# Patient Record
Sex: FEMALE | Race: BLACK OR AFRICAN AMERICAN
[De-identification: names, ages, dates, MRNs, and addresses within clinical notes are randomized per-mention and may not be internally consistent; named-entity substitution may affect disease eponyms.]

---

## 2017-02-10 ENCOUNTER — HOSPITAL ENCOUNTER (EMERGENCY)
Dept: HOSPITAL 74 - JERFT | Age: 27
Discharge: HOME | End: 2017-02-10
Payer: COMMERCIAL

## 2017-02-10 VITALS — TEMPERATURE: 99.6 F | HEART RATE: 111 BPM

## 2017-02-10 VITALS — DIASTOLIC BLOOD PRESSURE: 89 MMHG | SYSTOLIC BLOOD PRESSURE: 143 MMHG

## 2017-02-10 VITALS — BODY MASS INDEX: 33.6 KG/M2

## 2017-02-10 DIAGNOSIS — J11.1: Primary | ICD-10-CM

## 2017-02-10 PROCEDURE — 3E0F7GC INTRODUCTION OF OTHER THERAPEUTIC SUBSTANCE INTO RESPIRATORY TRACT, VIA NATURAL OR ARTIFICIAL OPENING: ICD-10-PCS

## 2017-02-10 NOTE — PDOC
History of Present Illness





- General


Chief Complaint: Cold Symptoms


Stated Complaint: COLD SYMPTOMS


Time Seen by Provider: 02/10/17 21:53


History Source: Patient


Exam Limitations: No Limitations





- History of Present Illness


Initial Comments: 





02/10/17 22:00








Chief complaint: Generalized body aches, fever, chills, dry cough sore throat 

ear pain bilaterally and headache








History of present illness: Patient is a 26 year old female with no significant 

medical history here today complaining of generalized body aches with dry cough 

and sore throat and bilateral ear pain intermittent 3 days. Patient reports 

that fever and body aches started yesterday with headache. She reports that she 

got her influenza vaccine this year. Patient reports feeling chest tightness 

with slight shortness of breath today. Patient is unsure of whether or not she 

could be pregnant. Patient reports that other people at her job are sick 

however they do not say what is wrong with them. Patient has had no recent 

travel.


Timing/Duration: getting worse


Associated Symptoms: reports: cough, fever/chills, headaches, malaise, nausea/

vomiting (once last night ), shortness of breath (slight today with chest 

tightness), other (b/l ear pain, sore throat, headache )





Past History





- Past Medical History


Allergies/Adverse Reactions: 


 Allergies











Allergy/AdvReac Type Severity Reaction Status Date / Time


 


No Known Allergies Allergy   Verified 02/10/17 21:38











Home Medications: 


Ambulatory Orders





Azithromycin [Zithromax 250mg Tablets -] 250 mg PO UTDICT #6 tab 02/10/17 


Guaifenesin Dm [Mucinex Dm -] 2 tab PO Q12H PRN #20 tab.er.12h MDD 4 02/10/17 


Ibuprofen 600 mg PO Q6H PRN #18 tablet 02/10/17 








Thyroid Disease: No





- Immunization History


Immunization Up to Date: Yes





- Psycho/Social/Smoking Cessation Hx


Anxiety: No


Suicidal Ideation: No


Smoking History: Never smoked


Have you smoked in the past 12 months: No


Information on smoking cessation initiated: No


Hx Alcohol Use: No


Drug/Substance Use Hx: No


Substance Use Type: None





**Review of Systems





- Review of Systems


Able to Perform ROS?: Yes


Constitutional: Yes: Fever, Loss of Appetite


HEENTM: Yes: Ear Pain (b/l ), Nose Congestion, Throat Pain


Respiratory: Yes: Cough, Shortness of Breath (intermittent ).  No: SOB with 

Exertion, SOB at Rest, Stridor, Wheezing, Productive cough


Cardiac (ROS): Yes: Chest Tightness (today )


ABD/GI: Yes: Vomiting (once last night )


: No: Symptoms Reported


Musculoskeletal: Yes: Other (generalized bodyaches)


Integumentary: No: Symptoms Reported


Neurological: Yes: Headache (intermitent)





*Physical Exam





- Vital Signs


 Last Vital Signs











Temp Pulse Resp BP Pulse Ox


 


 101 F H  119 H  22   143/89   98 


 


 02/10/17 21:32  02/10/17 21:32  02/10/17 21:32  02/10/17 21:32  02/10/17 21:32














- Physical Exam


General Appearance: Yes: Appropriately Dressed


HEENT: positive: TMs Normal, Pharyngeal Erythema, Nasal Congestion, Rhinorrhea (

clear).  negative: Tonsillar Exudate, Tonsillar Erythema


Neck: negative: Lymphadenopathy (R), Lymphadenopathy (L)


Respiratory/Chest: positive: Lungs Clear, Normal Breath Sounds.  negative: 

Chest Tender, Respiratory Distress


Cardiovascular: positive: Regular Rhythm, Regular Rate, S1, S2


Gastrointestinal/Abdominal: positive: Normal Bowel Sounds, Soft.  negative: 

Tender, Organomegaly, Distended, Guarding, Rebound, Tenderness, Hepatomegaly, 

Spleenomegaly


Integumentary: positive: Normal Color


Neurologic: positive: CNs II-XII NML intact, Alert, Normal Response, Responsive





Medical Decision Making





- Medical Decision Making


02/10/17 22:02


Patient is a 26 year old female with no significant medical history here today 

complaining of generalized body aches with dry cough and sore throat and 

bilateral ear pain intermittent 3 days. Patient reports that fever and body 

aches started yesterday with headache. She reports that she got her influenza 

vaccine this year. Patient reports feeling chest tightness with slight 

shortness of breath today. Patient is unsure of whether or not she could be 

pregnant. Patient reports that other people at her job are sick however they do 

not say what is wrong with them. Patient has had no recent travel.














Flulike symptoms rule out influenza A or B





Chest tightness with intermittent cough and slight shortness of breath





Rule out pregnancy








Plan:





Urine hCG negative 


DuoNeb now 


Influenza A and B rapid negative


will treat with azithromycin 250 mg 2 tab today than one daily for following 4 

days


Mucinex DM 2 caps every 12 hrs prn cough for 5 days





ibuprofen 600 mg every 6 hours as needed for fever or pain

















02/10/17 22:17








02/10/17 22:35








02/10/17 22:39








*DC/Admit/Observation/Transfer


Diagnosis at time of Disposition: 


 Flu-like symptoms





- Discharge Dispostion


Disposition: HOME


Condition at time of disposition: Stable





- Referrals


Referrals: 


Lizeth Rojas [Primary Care Provider] - 





- Patient Instructions


Additional Instructions: 











Drink a lot of fluids and rest








Follow-up with primary care provider within the next few days











Return to emergency room if any difficulty breathing or swallowing or any new 

symptoms develop














Patient voiced understanding of discharge instructions and all questions were 

answered
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
## 2017-12-19 NOTE — PDOC
History of Present Illness





<Khalida Jones - Last Filed: 12/19/17 16:49>





- General


History Source: Patient


Exam Limitations: No Limitations





- History of Present Illness


Initial Comments: 





12/19/17 16:51


The patient is a 27 year old female, with a significant past medical history of 

Anemia, Irregular menses who presents to the emergency department with nausea, 

vomiting (nonbilious, nonbloody), diarrhea for the past 4 days. Patient reports 

associated generalized abdominal pain, 7/10 in severity that is burning in 

nature which radiates from lower abdominal pain to epigastric. Patient has 

taken ginger ale, pepto bismol, bassem seltzer with no relief and presents to the 

ED for further evaluation. 





LMP: 2.5 months ago 





Patient denies chest pain, headache or dizziness. Patient denies fever, chills, 

constipation. Patient denies dysuria, frequency, urgency or hematuria. Patient 

denies sick contacts or recent travel. 











<Anaid Ha - Last Filed: 12/19/17 17:06>





- General


Chief Complaint: Pain, Acute


Stated Complaint: ABD PAIN


Time Seen by Provider: 12/19/17 14:03





Past History





- Past Medical History


Anemia: Yes


COPD: No


Thyroid Disease: No





- Immunization History


Immunization Up to Date: Yes





- Suicide/Smoking/Psychosocial Hx


Smoking History: Never smoked


Have you smoked in the past 12 months: No


Hx Alcohol Use: No


Drug/Substance Use Hx: No


Substance Use Type: None





<Khalida Jones - Last Filed: 12/19/17 16:49>





<Anaid Ha - Last Filed: 12/19/17 17:06>





- Past Medical History


Allergies/Adverse Reactions: 


 Allergies











Allergy/AdvReac Type Severity Reaction Status Date / Time


 


No Known Allergies Allergy   Verified 12/19/17 13:55











Home Medications: 


Ambulatory Orders





NK [No Known Home Medication]  07/17/17 











**Review of Systems





- Review of Systems


Able to Perform ROS?: Yes


Comments:: 





12/19/17 16:51





GENERAL/CONSTITUTIONAL: No fever or chills. No weakness.


HEAD, EYES, EARS, NOSE AND THROAT: No change in vision. No ear pain or 

discharge. No sore throat.


GASTROINTESTINAL: + nausea, vomiting, diarrhea. No constipation.


GENITOURINARY: No dysuria, frequency, or change in urination.


CARDIOVASCULAR: No chest pain or shortness of breath.


RESPIRATORY: No cough, wheezing, or hemoptysis.


MUSCULOSKELETAL: No joint or muscle swelling or pain. No neck or back pain.


SKIN: No rash


NEUROLOGIC: No headache, vertigo, loss of consciousness, or change in strength/

sensation.


ENDOCRINE: No increased thirst. No abnormal weight change.


HEMATOLOGIC/LYMPHATIC: No anemia, easy bleeding, or history of blood clots.


ALLERGIC/IMMUNOLOGIC: No hives or skin allergy.











<Anaid Ha - Last Filed: 12/19/17 17:06>





*Physical Exam





- Vital Signs


 Last Vital Signs











Temp Pulse Resp BP Pulse Ox


 


 97.9 F   77   19   115/75   100 


 


 12/19/17 13:56  12/19/17 13:56  12/19/17 13:56  12/19/17 13:56  12/19/17 13:56














<Khalida Jones - Last Filed: 12/19/17 16:49>





- Vital Signs


 Last Vital Signs











Temp Pulse Resp BP Pulse Ox


 


 97.9 F   77   19   115/75   100 


 


 12/19/17 13:56  12/19/17 13:56  12/19/17 13:56  12/19/17 13:56  12/19/17 13:56














- Physical Exam


Comments: 





12/19/17 16:51





GENERAL: Awake, alert, and fully oriented, in no acute distress


HEAD: No signs of trauma


EYES: PERRLA, EOMI, sclera anicteric, conjunctiva clear


ENT: Auricles normal inspection, hearing grossly normal, nares patent, 

oropharynx clear without exudates. Moist mucosa


NECK: Normal ROM, supple, no lymphadenopathy, JVD, or masses


LUNGS: Breath sounds equal, clear to auscultation bilaterally.  No wheezes, and 

no crackles


HEART: Regular rate and rhythm, normal S1 and S2, no murmurs, rubs or gallops


ABDOMEN: Soft, nontender, normoactive bowel sounds.  No guarding, no rebound.  

No masses


EXTREMITIES: Normal range of motion, no edema.  No clubbing or cyanosis. No 

cords, erythema, or tenderness


NEUROLOGICAL: Cranial nerves II through XII grossly intact.  Normal speech, 

normal gait


SKIN: Warm, Dry, normal turgor, no rashes or lesions noted.


PELVIC:Scant amount  of pink discharge. No CMT. No adnexal tenderness. 





<Anaid Ha - Last Filed: 12/19/17 17:06>





ED Treatment Course





- LABORATORY


CBC & Chemistry Diagram: 


 12/19/17 15:33





 12/19/17 15:33





<Khalida Jones - Last Filed: 12/19/17 16:49>





- LABORATORY


CBC & Chemistry Diagram: 


 12/19/17 15:33





 12/19/17 15:33





- ADDITIONAL ORDERS


Additional order review: 


 Laboratory  Results











  12/19/17 12/19/17





  15:33 15:16


 


Urine Color   Yellow


 


Urine Appearance   Slcloudy


 


Urine pH   7.0


 


Ur Specific Gravity   1.028


 


Urine Protein   Negative


 


Urine Glucose (UA)   Negative


 


Urine Ketones   1+ H


 


Urine Blood   Negative


 


Urine Nitrite   Negative


 


Urine Bilirubin   Negative


 


Urine Urobilinogen   Negative


 


Urine HCG, Qual  Positive 








 











  12/19/17





  15:33


 


RBC  5.16


 


MCV  76.2 L


 


MCHC  33.6


 


RDW  15.0


 


MPV  9.3


 


Neutrophils %  66.2


 


Lymphocytes %  24.7


 


Monocytes %  7.6


 


Eosinophils %  0.9


 


Basophils %  0.6














- Medications


Given in the ED: 


ED Medications














Discontinued Medications














Generic Name Dose Route Start Last Admin





  Trade Name Freq  PRN Reason Stop Dose Admin


 


Al Hydroxide/Mg Hydroxide  30 ml  12/19/17 14:17  12/19/17 15:20





  Mylanta Oral Suspension -  PO  12/19/17 14:18  30 ml





  ONCE ONE   Administration


 


Famotidine/Sodium Chloride  20 mg in 50 mls @ 100 mls/hr  12/19/17 14:16  12/19/ 17 15:25





  Pepcid 20 Mg Premixed Ivpb -  IVPB  12/19/17 14:45  100 mls/hr





  ONCE ONE   Administration


 


Ondansetron HCl  4 mg  12/19/17 14:16  12/19/17 15:20





  Zofran Injection  IVPUSH  12/19/17 14:17  4 mg





  ONCE ONE   Administration


 


Sodium Chloride  1,000 ml  12/19/17 14:16  12/19/17 15:20





  Normal Saline -  IV  12/19/17 14:17  1,000 ml





  ONCE ONE   Administration














<Anaid Ha - Last Filed: 12/19/17 17:06>





Medical Decision Making





- Medical Decision Making





12/19/17 16:49


a/p: 28yo female with abd pain, n/v/d


-will check labs


-meds


-will give ivf hydration


12/19/17 16:49


pt with +beta on exam


12/19/17 16:49


pt updated and getting undressed for pelvic exam


had home preg test yesterday that was negative


urine preg +


no menstrual cycle in 2.5 months


suspected gastroenteritis


now will check for IUP and n/v most likely due to preg





<Khalida Jones - Last Filed: 12/19/17 16:49>





- Medical Decision Making








12/19/17 17:02


Upon re-evaluation, patient reports minimal bloody vaginal discharge. 


Pelvic Exam reveals scant pink bloody discharge. 














<Anaid Ha - Last Filed: 12/19/17 17:06>





*DC/Admit/Observation/Transfer





<Khalida Jones - Last Filed: 12/19/17 16:49>





- Attestations


Scribe Attestion: 





12/19/17 16:51





Documentation prepared by Anaid Ha, acting as medical scribe for Khalida Jones DO








<Anaid Ha - Last Filed: 12/19/17 17:06>





- Referrals


Referrals: 


Shannon Galindo MD [Primary Care Provider] - 





- Patient Instructions





- Post Discharge Activity

## 2018-04-19 NOTE — PN
Progress Note (short form)





- Note


Progress Note: 


23.2 weeks, lt renal colick, s/p demerol 50 mg  + phenrgan 25 mg iv stat at 

9.45 Pm ( 18) 


pt rested  & slept after demerol , pain free,, no c/o back ache 


presently c/o pain scale 6/10 , no vomiting, did not pass any stone yet 


fm are active 


no c/o cramps 








 Selected Entries











  18





  06:00


 


Temperature 98.3 F


 


Pulse Rate 85


 


Blood Pressure 123/71








labs are noted, cmp not done , 


                     uc/s not done 


plan order above tests 


               reg diet 


               ct iv fluids , & encourage po fluids 


               urology consult pending 


               transfer to floor ( [address] ) 





Problem List





- Problems


(1) 23 weeks gestation of pregnancy


Code(s): Z3A.23 - 23 WEEKS GESTATION OF PREGNANCY   





(2) Renal colic on left side


Code(s): N23 - UNSPECIFIED RENAL COLIC   





(3) Previous  section


Code(s): Z98.891 - HISTORY OF UTERINE SCAR FROM PREVIOUS SURGERY   





(4) Obesity (BMI 30.0-34.9)


Code(s): E66.9 - OBESITY, UNSPECIFIED
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
## 2018-04-20 NOTE — DS
Physical Exam-GYN


Vital Signs: 


 Vital Signs











Temperature  97.9 F   18 09:30


 


Pulse Rate  83   18 09:30


 


Respiratory Rate  20   18 09:30


 


Blood Pressure  120/68   18 09:30


 


O2 Sat by Pulse Oximetry (%)      











Constitutional: Yes: Well Nourished, Obese


Eyes: Yes: WNL


HENT: Yes: WNL


Neck: Yes: WNL


Cardiovascular: Yes: Pulse Irregular


Respiratory: Yes: WNL


Gastrointestinal: Yes: WNL


...Rectal Exam: Yes: WNL


Renal/: Yes: Other (pt either has no complain or minimal pain.  voiding 

without difficulty)


Pelvis: Yes: WNL


Cervix: Yes: Normal, Other (cx closed)


Uterus: Yes: Other (   23 .4 weeks pregnant FPF.FM active.FHS+ no cramps or uc 

palpable.)


Breast(s): Yes: WNL


Extremities: No: Calf Tenderness


Edema: No


Integumentary: Yes: Incision (prevoius pfannensteil scar of c/section)


Neurological: Yes: WNL


...Motor Strength: WNL


Psychiatric: Yes: WNL


Labs: 


 CBC, BMP





 18 07:15 





 18 07:15 





urine culture : no growth.





Discharge Summary


Reason For Visit: LABOR


Current Active Problems





23 weeks gestation of pregnancy (Acute)


Obesity (BMI 30.0-34.9) (Acute)


Previous  section (Acute)


Renal colic on left side (Acute)








Procedures: Principal: iv hydration


Other Procedures: ob ultrasound 23.6 weeks/sliup/breech/cx3.6 cm.  bilateral 

kidney us, Left hydronephrosi, possible block in lower part of collecting 

system.  pelvic / bladder us full bladder  Right ureter jet noted, Left 

ureter jet not noted.  18 Reoeat bladder pelvic us : bilateral ureter jet 

visualised


Hospital Course: 





Iv Hydration followed by ct iv fluids at 150 ml/hr


intial Demerol 50 mg + phergan 25 mg for pain control followed by Tylenol & or 

Oxycodone.


Urology consult with Dr Henning :Tel recommendation about pelvic /bladder us / 

Iv Hydration / Po Flomax 0.4 mg daily for ureter dilatation.


urine straining was done.


pt improved n pin free, Repeat us bilateral ureteric jet noted


Fetal surviellence by franca Twice shift NST was done 


























Condition: Stable





- Instructions


Diet, Activity, Other Instructions: 


Discharge Instructions


* Out of Bed


* drink plenty po fluids


* Regular Diet      


* quit smoking


* Rtc 1 week, keep prenatal clinic appt. 


* ct prenatal vit daily


* keep kick count.





 





Disposition: HOME





- Home Medications


Comprehensive Discharge Medication List: 


Ambulatory Orders





B12/Levomefolate Calcium/B-6 [Foltx Tablet] 1 each PO DAILY 18 


Prenatal Vit No.130/Iron/Folic [Prenatal Vitamins] 1 each PO DAILY 18 


Acetaminophen [Tylenol .Regular Strength -] 650 mg PO Q4H PRN  tablet 18 


Prenatal Vitamins (Sjr) - 1 tab PO DAILY  tablet 18

## 2018-04-20 NOTE — PN
Progress Note (short form)





- Note


Progress Note: 





HD1 ,has mild supra pubic pain, no flank pan , no contraction, no vaginal 

bleeding, no hematuria, no dysuria


 Last Vital Signs











Temp Pulse Resp BP Pulse Ox


 


 98.2 F   81   20   103/51    


 


 04/19/18 21:00  04/19/18 21:00  04/19/18 21:00  04/19/18 21:00   











afebrile


abdomen soft, non tender, no cva 


uterus non tender, soft


no calf tenderness 


plan urology evaluation, .cbc 


possible d/c home after urology evaluation

## 2018-08-15 NOTE — OP
Operative Note





- Note:


Operative Date: 08/15/18


Pre-Operative Diagnosis: 40.2 weeks, previous c/section , requests for repeat c/

s


Operation: Repeat LFTC/Section & Lysis of adhesions


Findings: 


8.44AM, Baby Boy ,Apgar 9/9, wt 8'7",Vx,  LOT position 


Both tubes & Ovaries normal 


adhesions utero lateral abdominal wall & omental adhesions lysis done 


Jason Neonatologist present in the OR 








Surgeon: Megan Jaime


Assistant: Coy Cooley


Anesthesiologist/CRNA: Cristhian Feng


Anesthesia: Spinal


Specimens Removed: cord blood.  placenta


Estimated Blood Loss (mls): 800


Drains, Volume Out (mls): 300 (ruiz output brina color )


Fluid Volume Replaced (mls): 2,000 (iv ancef 2 gm prior to incision )


Operative Report Dictated: Yes

## 2018-08-15 NOTE — OP
DATE OF OPERATION:  08/15/2018

 

PREOPERATIVE DIAGNOSIS:  At 40-2/7 weeks, previous  section, request for

repeat  section.

 

OPERATION DONE:  Repeat low-flap transverse  section, lysis of adhesions.

 

SURGEON:  Megan Jaime MD

 

ASSISTANT SURGEON:  JARED Meyer

 

ANESTHESIOLOGIST:  Cristhian Feng MD

 

ANESTHESIA:  Spinal.  

 

NEONATOLOGIST:  Leilani Kaplan MD

 

FINDINGS:  This is a 28-year-old,  2, para 1-0-0-1, previous  section,

and not in labor.  

 

POSTOPERATIVE FINDINGS:  Baby boy, Apgar 9 and 9, weight 8 pounds 7 ounces, LOT

position.

 

PROCEDURE:  Abdomen was shaved, prepped.  Trammell catheter was placed.  She was taken

to the operating room table.  Spinal anesthesia was given.  Abdomen was painted and

draped in the usual manner.  Pfannenstiel incision was made through previous scar,

skin and subcutaneous tissue.  Anterior rectus sheath was incised transversely. 

Bleeding points were clamped and cauterized.  Then rectus muscle was  from

the rectus sheath.  Parietal peritoneum was opened vertically and then it was

extended transversely.  There were adhesions between the uterus and the left lateral

abdominal wall, which were lysed.  There was no definite bladder peritoneum noted. 

The bladder was pushed down and then lower uterine segment was isolated.  Lower

uterine segment was incised transversely.  Amniotic fluid was clear.  Baby was

delivered at 8:44 a.m. from LOT position.  Amniotic fluid was brina color, clear. 

Baby's Apgar was 9 and 9, weight was 8 pounds 7 ounces.  Baby was handed over to Dr. Kaplan.  Cord was clamped, cut, cord blood was collected.  Placenta was removed

completely with the membranes.  Uterine cavity was cleaned, and the closure of the

uterine incision was done in 2 layers.  The first layer was a continuous locking

suture with a Biosyn 0 suture, second layer was a continuous intermittent locking

with a Biosyn 0 suture.  Hemostasis was verified and incision was then covered with

an interrupted suture between the uterine serosa and the bladder peritoneum.  Both

the tubes and ovaries were normal.  Irrigation was done.  Sponge, instrument, needle

count was correct.  Then the closure of the abdomen was done.  Before that, omental

adhesions to the abdomen, uterus, and the omentum were clamped and cauterized, and

also between uterus and the left lateral abdominal wall, adhesions were dissected and

hemostasis was noted.  Then the parietal peritoneum was closed with a Vicryl 0

suture.  Continuous sutures were taken.  The muscles were approximated together with

a Vicryl 0 suture, interrupted sutures were taken.  Anterior rectus sheath was

mobilized from underneath the skin and then anterior rectus sheath was closed with a

Vicryl 0 suture.  Continuous sutures were taken.  Hemostasis was checked at each

layer.  Then the skin and subcutaneous tissue was approximated with a 2-0 Biosyn

suture and then skin was approximated with 3-0 Vicryl intradermal suture on a

straight needle.  Steri-Strips applied.  Pressure dressing was given.  Blood clots

were removed from the vagina.  Estimated blood loss was 800 mL, intraoperative urine

output was 300 mL and it was brina color.  IV fluids given were 2000 mL during the

surgery.  IV Ancef 2 g, prior to the incision, was given.  Patient tolerated the

procedure well and she was transferred to the recovery room in stable condition. 

 

 

JOVI [id number]

DD: 08/15/2018 10:30

DT: 08/15/2018 20:03

Job #:  [id number]

## 2018-08-15 NOTE — PN
Delivery





- Delivery


 Section: Repeat, Low Flap Transverse (Lysis of Addhesions)


Type of Anesthesia: Spinal


Episiotomy/Laceration: None


EBL (cc): 800 (ruiz output )





Delivery, Single Birth





- Stages of Labor


Date of Delivery: 08/15/18


Time of Delivery: 08:44


Time Placenta Delivered: 08:45


Placenta: Yes: Manual Removal, Uterine Exploration





- Condition of Infant


Pediatrician/Neonatologist Present: Yes


Name: Leilani Kaplan


Infant Gender: Male


Birth Weight: 8 lb 7 oz


Position: Left, OT


Total Hours ROM (Hrs/Mins): 3 mins





- Apgar


  ** 1 Minute


Apgar Total Score: 9





  ** 5 Minutes


Apgar Total Score: 9





-  Feeding Plan


Initial Plan: Elected not to breastfeed exclusively throughout hospitalization





Remarks





- Remarks


Remarks: 


28 yrs , 40.2 weeks , pnc at 2, [facility] 


Indication; 40.2 weeks, previous c/section 


gbs neg 


h/o marijuana pos during pregnancy , stopped 


h/o depression in past 


Intraop course uneventful 


2 gm iv Ancef preop was given

## 2018-08-15 NOTE — HP
Past Medical History





- Primary Care Physician


PCP:: Megan Jaime





- Admission


Chief Complaint: 28 yrs  , 40.2 weeks, previous c/section, requests for 

repeat c/section


History of Present Illness: 


PNC at 2, [facility] 


wt gain 36 lbs 


prenatal work up A neg, Rhogam taken on 19 .


Hbsag neg, Rpr nr, Hiv neg 18 & 1718 , Rubella immune, Quqntiferon neg, 

PNGT 92, GBS neg, gc/ct neg, Pap NILM .


h/o HSV Igg pos .Rx po valtrex 500 mg daily continued 


h/o hospitalization for Renal colick in 4/18/2018 x3 days left side hydroureter 

was seen ,block suspected  Rx with Flomax .


h/o depression , seen therapist once only.No meds  


h/o marijuana pos in 18 in the hosp &  in the clinic , Last checked on 

18 neg .


Fetal growth US were done by MFM 








Limitations to Obtaining History: No Limitations, Intubated





- Past Medical History


CNS: No: Migraine, Seizure


Cardiovascular: No: HTN, Murmur


Pulmonary: No: Asthma


Gastrointestinal: Yes: Constipation.  No: Gastritis, GERD


Hepatobiliary: No: Hepatitis B


Reproductive: Yes: Other (h/o menorrhagia , h/o blood transfusion at age 12 yrs 

& 30 yrs)


...: 2


...Para: 1 (1/26/15 Primary LFTC/S , FTP 6'12",[facility])


...Term: 0


...: 1


...Spon : 0


...Induced : 0


...Multiple Gestation: 0


...LMP: 10/25/17


... Weeks Gestation by Dates: 42.0


...EDC by Dates: 18


...EDC by Sono: 18 (40.2 weeks )


Heme/Onc: Yes: Anemia (in past , h/o blood transfusion)


Infectious Disease: Yes: STD's (h/o Gonorrhea treated in past).  No: AIDS, HIV, 

Tuberculosis


Psych: Yes: Depression (h/o Depression, treted with Zoloft in past. Pt states 

she is not on any meds during pregn)





- Past Surgical History


Past Surgical History: Yes:  (2015)


Hx Myomectomy: No


Hx Transabdominal Cerclage: No





- Smoking History


Smoking history: Never smoked


Have you smoked in the past 12 months: No





- Alcohol/Substance Use


Hx Alcohol Use: No


History of Substance Use: reports: Marijuana


Date of Last Use: 05/15/18 (18 urine drug tox neg )





Home Medications





- Allergies


Allergies/Adverse Reactions: 


 Allergies











Allergy/AdvReac Type Severity Reaction Status Date / Time


 


No Known Allergies Allergy   Verified 18 20:52














- Home Medications


Home Medications: 


Ambulatory Orders





B12/Levomefolate Calcium/B-6 [Foltx Tablet] 1 each PO DAILY 18 


Prenatal Vit No.130/Iron/Folic [Prenatal Vitamins] 1 each PO DAILY 18 


Acetaminophen [Tylenol .Regular Strength -] 650 mg PO Q4H PRN  tablet 18 


Prenatal Vitamins (Sjr) - 1 tab PO DAILY  tablet 18 











Physical Exam - Maternity


Vital Signs: 


 Vital Signs











Temperature  98.5 F   08/15/18 06:35


 


Pulse Rate  97 H  08/15/18 06:35


 


Respiratory Rate  20   08/15/18 06:35


 


Blood Pressure  103/64   08/15/18 06:35


 


O2 Sat by Pulse Oximetry (%)      











Constitutional: Yes: Well Nourished, No Distress


Eyes: Yes: WNL


HENT: Yes: WNL, Normocephalic


Neck: Yes: WNL


Cardiovascular: Yes: WNL, Regular Rate and Rhythm


Lungs: Clear to auscultation


Breast(s): Yes: WNL





- Abdominal Exam/OB


Fundal Height: 40


Number of Fetuses: Single


Fetal Presentation: Vertex


Contractions: No


Fetal Monitor Mode: External


Fetal Heart Rate (range): 130


Fetal Heart Rate Location: [facility]


Category: I


Accelerations: Uniform


Decelerations: None





- Vaginal Exam/OB


Vaginal Bleediing: No


Speculum Exam: No


Dilatation (cm): close


Effacement (%): unefface


Fetal Presentation: Vertex/Position


Fetal Station: -3





- Physical Exam


Musculoskeletal: Yes: WNL


Extremities: Yes: WNL.  No: Calf Tenderness


Edema: Yes


Edema: LLE: 1+, RLE: 1+


Integumentary: Yes: Incision (pfannensteil scar), Tattoos


Deep Tendon Reflex Grade: Normal +2


...Motor Strength: WNL


Psychiatric: Yes: WNL, Alert, Oriented





- Labs


Lab Results: 





 Laboratory Tests











  18





  16:50 16:50 20:45


 


WBC   


 


Hgb   


 


Hct   


 


Plt Count   


 


PT with INR   


 


INR   


 


PTT (Actin FS)    28.3


 


Sodium   


 


Potassium   


 


Chloride   


 


Carbon Dioxide   


 


BUN   


 


Creatinine   


 


Random Glucose   


 


AST   


 


ALT   


 


Urine Protein   Negative 


 


U Marijuana (THC) Screen  Positive  


 


RPR Titer   














  18





  11:25 11:25 11:25


 


WBC  5.9  


 


Hgb  12.9  


 


Hct  37.5  


 


Plt Count  173  D  


 


PT with INR   11.20 


 


INR   0.99 


 


PTT (Actin FS)   


 


Sodium    139


 


Potassium    4.2


 


Chloride    107


 


Carbon Dioxide    23


 


BUN    8


 


Creatinine    0.6


 


Random Glucose    72 L


 


AST    16


 


ALT    22


 


Urine Protein   


 


U Marijuana (THC) Screen   


 


RPR Titer   














  18





  11:25


 


WBC 


 


Hgb 


 


Hct 


 


Plt Count 


 


PT with INR 


 


INR 


 


PTT (Actin FS) 


 


Sodium 


 


Potassium 


 


Chloride 


 


Carbon Dioxide 


 


BUN 


 


Creatinine 


 


Random Glucose 


 


AST 


 


ALT 


 


Urine Protein 


 


U Marijuana (THC) Screen 


 


RPR Titer  Nonreactive








 Laboratory Tests











  18





  20:45 11:25 11:25


 


WBC   5.9 


 


Hgb   12.9 


 


Plt Count   173  D 


 


PT with INR    11.20


 


INR    0.99


 


PTT (Actin FS)  28.3  


 


Sodium   


 


Potassium   


 


Chloride   


 


Carbon Dioxide   


 


BUN   


 


Creatinine   


 


AST   


 


ALT   














  18





  11:25


 


WBC 


 


Hgb 


 


Plt Count 


 


PT with INR 


 


INR 


 


PTT (Actin FS) 


 


Sodium  139


 


Potassium  4.2


 


Chloride  107


 


Carbon Dioxide  23


 


BUN  8


 


Creatinine  0.6


 


AST  16


 


ALT  22














Hemorrhage Risk Assessment





- Risk Factors


Medium Risk Factors: Yes: Prior , uterine surgery,or multiple 

laparotomies


Risk Score: 1


Risk Level: Medium Risk





Problem List





- Problems


(1) Post term pregnancy over 40 weeks


Code(s): O48.0 - POST-TERM PREGNANCY   





(2) Previous  section


Code(s): Z98.891 - HISTORY OF UTERINE SCAR FROM PREVIOUS SURGERY   





(3) H/O cannabis abuse


Code(s): Z87.898 - PERSONAL HISTORY OF OTHER SPECIFIED CONDITIONS   





(4) H/O: depression


Code(s): Z86.59 - PERSONAL HISTORY OF OTHER MENTAL AND BEHAVIORAL DISORDERS   





Assessment/Plan


28 yrs  , previous c/section , 40.2 weeks requests for repeat c/section .


h/o depression in past 


h/o marijuana in past 


Plan Repeat LFTC/section

## 2018-08-16 NOTE — PN
Post Partum Progress Note





- Subjective


Subjective: 


c/o pain scale 8/10 


voided after ruiz  catheter was taken out 


c/o gaseous discomfort .


c/o itching of body yesterday 





Post Partum Day: 1


Type of Delivery: Repeat C/S


Vital Signs: 


 Vital Signs











Temperature  98.4 F   18 10:00


 


Pulse Rate  82   18 10:00


 


Respiratory Rate  20   18 10:00


 


Blood Pressure  113/61   18 10:00


 


O2 Sat by Pulse Oximetry (%)  100   08/15/18 11:04











Breast Exam: Yes: Soft.  No: Engorged


Uterus: Yes: Fundus Firm, Fundus above umbilicus, Non-tender


Incision: Yes: Dressing dry and intact.  No: Oozing


Abdomen/GI: Yes: Abdomen soft, Tolerating PO (liqiuds ).  No: Abdominal 

Distention, Tender, Passing flatus


Lochia: Yes: Rubra


Lochia, amount: Moderate


Extremities: Yes: Calves non-tender


Perineum: Yes: Intact


Activity: Other (oob in chair )





- Labs


Labs: 


 CBC











WBC  8.2 K/mm3 (4.0-10.0)   18  06:15    


 


RBC  4.46 M/mm3 (3.60-5.2)   18  06:15    


 


Hgb  12.3 GM/dL (10.7-15.3)   18  06:15    


 


Hct  34.6 % (32.4-45.2)   18  06:15    


 


MCV  77.7 fl (80-96)  L  18  06:15    


 


MCH  27.5 pg (25.7-33.7)   18  06:15    


 


MCHC  35.4 g/dl (32.0-36.0)   18  06:15    


 


RDW  14.3 % (11.6-15.6)   18  06:15    


 


Plt Count  155 K/MM3 (134-434)   18  06:15    


 


MPV  9.1 fl (7.5-11.1)   18  06:15    


 


Absolute Neuts (auto)  6.0 K/mm3 (1.5-8.0)   18  06:15    


 


Neutrophils %  72.9 % (42.8-82.8)   18  06:15    


 


Lymphocytes %  17.7 % (8-40)  D 18  06:15    


 


Monocytes %  7.4 % (3.8-10.2)   18  06:15    


 


Eosinophils %  1.5 % (0-4.5)   18  06:15    


 


Basophils %  0.5 % (0-2.0)   18  06:15    


 


Nucleated RBC %  0 % (0-0)   18  06:15    











Other Findings, Remarks: 


rs cta


i/o adequate 








Problem List





- Problems


(1) Post term pregnancy over 40 weeks


Code(s): O48.0 - POST-TERM PREGNANCY   





(2) Previous  section


Code(s): Z98.891 - HISTORY OF UTERINE SCAR FROM PREVIOUS SURGERY   





(3) H/O cannabis abuse


Code(s): Z87.898 - PERSONAL HISTORY OF OTHER SPECIFIED CONDITIONS   





(4) H/O: depression


Code(s): Z86.59 - PERSONAL HISTORY OF OTHER MENTAL AND BEHAVIORAL DISORDERS   





(5) Status post  section routine postpartum follow-up


Code(s): Z39.2 - ENCOUNTER FOR ROUTINE POSTPARTUM FOLLOW-UP; Z98.891 - HISTORY 

OF UTERINE SCAR FROM PREVIOUS SURGERY   





Assessment/Plan


stable s/p repeat c/section 


encourage po fluids , deep breathing & ambulation


dulcolax suppository RI , if passes flatus , will advance to reg diet

## 2018-08-16 NOTE — PN
Progress Note (short form)





- Note


Progress Note: 





POD #1 - s/p  under spinal anesthesia with duramorph. VSS. Pt. doing 

well, sitting


comfortably in chair breastfeeding. No complaints. Good pain control. No 

apparent anesthetic


complications noted. Continue current care.

## 2018-08-17 NOTE — PN
Progress Note (short form)





- Note


Progress Note: 





pod 2 s/p c/s 


doing well. ambulating, passing gas


 CBC, BMP





 08/16/18 06:15 





abdomen soft, no distension, no cva 


incision dry, clean 


no calf tenderness 


lochia mild , 


plan ambulate, cbc in am

## 2018-08-18 NOTE — PN
Progress Note (short form)





- Note


Progress Note: 





pod 3 s/p c/s doing well, voids ok 


 CBC, BMP





 08/18/18 08:18 





 Last Vital Signs











Temp Pulse Resp BP Pulse Ox


 


 98.3 F   84   20   128/79   100 


 


 08/17/18 21:00  08/17/18 21:00  08/17/18 21:00  08/17/18 21:00  08/15/18 11:04








abdomen soft, no distension , no cva 


incision dry, clean 


no calf tenderness 


plan d/c home, follow up [facility] 1 week

## 2018-08-21 NOTE — PATH
Surgical Pathology Report



Patient Name:  KORTNEY TERRY

Accession #:  [id number]

Med. Rec. #:  [id number]                                                        

   /Age/Gender:  1990 (Age: 28) / F

Account:  [id number]                                                          

             Location: [facility] OBS/GYN

Taken:  8/15/2018

Received:  2018

Reported:  2018

Physicians:  Megan Jaime M.D.

  



Specimen(s) Received

 PLACENTA 





Clinical History

42 weeks gestation, repeat 

History of blood transfusion at age 12 and 20







Final Diagnosis

PLACENTA:

THIRD TRIMESTER PLACENTA.

TRIVASCULAR CORD.

MEMBRANES WITH NO DIAGNOSTIC ABNORMALITIES.





***Electronically Signed***

Amanda Mcclain M.D.





Gross Description

The specimen is received fresh labeled placenta and is a 473 gram, 19.5 x 17.5 x

2.3 cm. placenta with attached membranes and umbilical cord.  The attached

membranes are tan, translucent with focal opacities and insert marginally.  The

umbilical cord measures 36 cm. in length and averages 1.0 cm. in diameter.  The

cord inserts eccentrically, 6 cm. to the nearest margin.  No true knots or

strictures are identified. Cut surface of the umbilical cord reveals 3 vessels.

The fetal surface is grey blue with moderate fibrin deposition and appropriate

caliber vessels. The maternal surface is red-brown with focal defects. 

Sectioning reveals red-brown, spongy parenchyma.  No lesions are identified. 

Representative sections are submitted in three cassettes as follows: 1- membrane

rolls and umbilical cord; 2-3- full thickness sections of placenta.





[facility]

## 2019-04-10 NOTE — PDOC
History of Present Illness





- General


Chief Complaint: Pain, Acute


Stated Complaint: PAIN


Time Seen by Provider: 04/10/19 19:01





- History of Present Illness


Initial Comments: 





04/10/19 20:31


20-year-old female presents for evaluation of right knee swelling times one 

week no systemic symptoms no injury





Past History





- Past Medical History


Allergies/Adverse Reactions: 


 Allergies











Allergy/AdvReac Type Severity Reaction Status Date / Time


 


No Known Allergies Allergy   Verified 04/18/18 20:52











Home Medications: 


Ambulatory Orders





Cephalexin [Keflex] 500 mg PO QID #40 capsule 04/10/19 


Sulfamethoxazole/Trimethoprim [Bactrim Ds -] 1 tab PO BID #14 tablet 04/10/19 








Anemia: Yes


Asthma: No


Cancer: No


Cardiac Disorders: No


COPD: No


Diabetes: No


HTN: No


Seizures: No


Thyroid Disease: No





- Immunization History


Immunization Up to Date: Yes





- Suicide/Smoking/Psychosocial Hx


Smoking History: Never smoked


Have you smoked in the past 12 months: No


Information on smoking cessation initiated: No


'Breaking Loose' booklet given: 04/20/18


Hx Alcohol Use: No


Drug/Substance Use Hx: No


Substance Use Type: None


Hx Substance Use Treatment: No





**Review of Systems





- Review of Systems


Constitutional: No: Fever


Musculoskeletal: Yes: See HPI, Joint Pain


Integumentary: Yes: See HPI, Erythema, Flushing





*Physical Exam





- Vital Signs


 Last Vital Signs











Temp Pulse Resp BP Pulse Ox


 


 98.6 F   74   18   115/80   98 


 


 04/10/19 19:02  04/10/19 19:02  04/10/19 19:02  04/10/19 19:02  04/10/19 19:02














- Physical Exam


Comments: 





04/10/19 20:31


There is a small pustule in the anterior aspect of the knee on the skin 

overlying the distal pole of the patella. There is mild darkening of the skin, 

warmth, no induration or focal fluctuance. There is no drainage that can be 

expressed from the pustule. Full range of motion no intra-articular effusion 

passive range of motion is nonpainful no instability or gross sensorimotor 

deficits she is neurovascularly intact.





Medical Decision Making





- Medical Decision Making





04/10/19 20:32


This is a right knee cellulitis. The outlined borders of the skin changing 

color was documented with a marker on the patient I will place her on Bactrim 

and Keflex and have her follow-up with orthopedic surgery for further 

evaluation and treatment options. This is not a septic joint





*DC/Admit/Observation/Transfer


Diagnosis at time of Disposition: 


 Right knee pain, Cellulitis








- Discharge Dispostion


Disposition: HOME


Condition at time of disposition: Stable


Decision to Admit order: No





- Prescriptions


Prescriptions: 


Cephalexin [Keflex] 500 mg PO QID #40 capsule


Sulfamethoxazole/Trimethoprim [Bactrim Ds -] 1 tab PO BID #14 tablet





- Referrals


Referrals: 


Dre Casarez DO [Staff Physician] - 





- Patient Instructions


Printed Discharge Instructions:  DI for Cellulitis -- Adult, Cellulitis


Additional Instructions: 


Please take the antibiotics as directed. May take Tylenol Motrin as directed 

for pain. Follow-up with orthopedic surgery in 2-3 days for further evaluation 

and treatment options and return to the emergency room should symptoms worsen 

or go unresolved. Return to the emergency room should this change its color of 

your skin extend the on the outline borders





- Post Discharge Activity

## 2019-04-10 NOTE — PDOC
Rapid Medical Evaluation


Time Seen by Provider: 04/10/19 19:01


Medical Evaluation: 


 Allergies











Allergy/AdvReac Type Severity Reaction Status Date / Time


 


No Known Allergies Allergy   Verified 04/18/18 20:52











04/10/19 19:01


Pt c/o: rt knee pain and swelling x 1 week, denies injury, no fever, no sx to 

area, no hx of MRSA


Pt on brief exam: FROM of rt patella, no increased warmth, noted pinhead size 

opening to center , 


Pt ordered for: none


Pt to proceed to the ED





**Discharge Disposition





- Diagnosis


 Right knee pain








- Referrals





- Patient Instructions





- Post Discharge Activity

## 2019-09-01 NOTE — PDOC
History of Present Illness





- General


Chief Complaint: Pain


Stated Complaint: ABD PAIN


Time Seen by Provider: 19 11:10





- History of Present Illness


Initial Comments: 


29 year old  with latest  1 year prior presenting with sudden 

onset intermittent left lower quadrant pain for the past day. Stats that this 

pain is sudden onset and co-presents with nausea and vomiting when it attains 

maximal intensity. She ate a slice of a pizza yesterday that she threw up but 

is otherwise feeling well. She has chills when the pain comes on. Deneis fevers

, diarrhea, headache, weakness, or other symptoms.


19 12:05








Past History





- Past Medical History


Allergies/Adverse Reactions: 


 Allergies











Allergy/AdvReac Type Severity Reaction Status Date / Time


 


No Known Allergies Allergy   Verified 19 10:54











Home Medications: 


Ambulatory Orders





Pnv No.95/Ferrous Fum/Folic AC [Prenatal Caplet] 1 each PO DAILY 60 Days #60 

tablet 19 








Anemia: Yes


Asthma: No


Cancer: No


Cardiac Disorders: No


COPD: No


Diabetes: No


HTN: No


Seizures: No


Thyroid Disease: No





- Immunization History


Immunization Up to Date: Yes





- Suicide/Smoking/Psychosocial Hx


Smoking History: Never smoked


Have you smoked in the past 12 months: No


'Breaking Loose' booklet given: 18


Hx Alcohol Use: No


Drug/Substance Use Hx: No


Substance Use Type: None


Hx Substance Use Treatment: No





**Review of Systems





- Review of Systems


Constitutional: Yes: Chills.  No: Diaphoresis, Fever


HEENTM: No: Eye Pain, Blurred Vision, Tearing


Respiratory: No: Cough, Orthopnea, Shortness of Breath


Cardiac (ROS): No: Chest Pain, Edema, Irregular Heart Rate


ABD/GI: No: Diarrhea, Nausea, Vomiting


: No: Burning, Dysuria, Discharge


Musculoskeletal: No: Back Pain, Gout, Joint Pain


Integumentary: No: Bruising, Erythema


Neurological: No: Headache, Numbness, Paresthesia


Psychiatric: No: Anxiety, Depression


Hematologic/Lymphatic: No: Anemia, Blood Clots, Easy Bleeding





*Physical Exam





- Vital Signs


 Last Vital Signs











Temp Pulse Resp BP Pulse Ox


 


 98.5 F   71   18   129/71   100 


 


 19 10:51  19 10:51  19 10:51  19 10:51  19 10:51














- Physical Exam


General Appearance: Yes: Nourished, Appropriately Dressed.  No: Apparent 

Distress


HEENT: positive: EOMI, DEMARCUS, Normal ENT Inspection, Normal Voice


Neck: positive: Trachea midline, Normal Thyroid, Supple.  negative: Tender, 

Rigid


Respiratory/Chest: positive: Lungs Clear, Normal Breath Sounds.  negative: 

Chest Tender, Respiratory Distress, Accessory Muscle Use


Cardiovascular: positive: Regular Rhythm, Regular Rate


Female Pelvic Exam: positive: normal external exam, cervical os closed.  

negative: normal adnexa (slightly full left adnexa), CMT, discharge, lesions, 

adnexal tenderness, vaginal bleeding


Gastrointestinal/Abdominal: positive: Normal Bowel Sounds, Flat, Soft.  negative

: Tender


Lymphatic: negative: Adenopathy, Tenderness


Musculoskeletal: positive: Normal Inspection.  negative: Decreased Range of 

Motion


Extremity: positive: Normal Capillary Refill, Normal Inspection, Normal Range 

of Motion.  negative: Tender


Integumentary: positive: Normal Color, Dry, Warm


Neurologic: positive: Fully Oriented, Alert, Normal Mood/Affect, Normal Response

, Motor Strength 5/5





ED Treatment Course





- LABORATORY


CBC & Chemistry Diagram: 


 19 11:25





 19 11:25





Medical Decision Making





- Medical Decision Making


29 year old female with PMH of  x2 (most recnt one year prior) prentin 

ghtih nausea, vomiting, and left lower quadrant abdominal pain for the past 

day. Labs demonstrating elevated BHCG (35K ) and TVUS demonstrating 6w3D IUP HR 

112. Will DC with OB Gyn follow up with [facility] prenatal vitamins, and return 

precautions. 


19 13:05





UA clear, BHCG 35K. Will DC home with prenatal vitamins and follow up 

instructions with [facility].


19 14:18











*DC/Admit/Observation/Transfer


Diagnosis at time of Disposition: 


 IUP (intrauterine pregnancy), incidental





Abdominal pain


Qualifiers:


 Abdominal location: left lower quadrant Qualified Code(s): R10.32 - Left lower 

quadrant pain








- Discharge Dispostion


Disposition: HOME


Condition at time of disposition: Improved


Decision to Admit order: No





- Prescriptions


Prescriptions: 


Pnv No.95/Ferrous Fum/Folic AC [Prenatal Caplet] 1 each PO DAILY 60 Days #60 

tablet





- Referrals


Referrals: 


Megan Jaime MD [Staff Physician] - 





- Patient Instructions


Printed Discharge Instructions:  DI for Pregnancy -- Discomforts and Remedies


Additional Instructions: 


You have a 6w 2 D old pregnancy. Please take your prenatal vitamins and please 

follow up with Dr. Trinidad as soon as possible. Please return to the ED if 

you have new or worsening symptoms or vaginal bleeding.





- Post Discharge Activity

## 2019-09-01 NOTE — PDOC
Documentation entered by Tanya Zhao SCRIBE, acting as scribe for 

Khalida Jones DO.








Khalida Jones DO:  This documentation has been prepared by the Cece staples Nirvannie, SCRIBE, under my direction and personally reviewed by me in 

its entirety.  I confirm that the documentation accurately reflects all work, 

treatment, procedures, and medical decision making performed by me.  





Attending Attestation





- Resident


Resident Name: Geremias Famrenardsupa





- ED Attending Attestation


I have performed the following: I have examined & evaluated the patient, The 

case was reviewed & discussed with the resident, I agree w/resident's findings 

& plan





- HPI


HPI: 





19 12:19


The patient is a 29 year old female, with no significant past medical history, 

who presents to the emergency department with, intermittent LLQ abdominal pain 

which radiates downward to the mid-pelvic region with associated vomiting. 





She denies recent fevers, chills, headache or dizziness. She denies recent 

diarrhea or constipation. She denies recent  dysuria, frequency, urgency or 

hematuria. She denies recent chest pain or shortness of breath.





Allergies: NKDA 





- Physicial Exam


PE: 





19 14:05


Constitutional: Awake, alert, oriented.  No acute distress.


Head:  Normocephalic.  Atraumatic


Eyes:  PERRL. EOMI.  Conjunctivae are not pale.


ENT:  Mucous membranes are moist and intact. Posterior pharynx without exudates 

or erythema. Uvula midline.


Neck:  Supple.  Full ROM. No lymphadenopathy.


Cardiovascular:  Regular rate.  Regular rhythm. S1, S2 regular.  Distal pulses 

are 2+ and symmetric.  


Pulmonary/Chest:  No evidence of respiratory distress.  Clear to auscultation 

bilaterally  No wheezing, rales or rhonchi.


Abdominal:  Soft and non-distended.  There is no tenderness.  No rebound, 

guarding or rigidity.  No organomegaly. No palpable masses. Good bowel sounds.


Back:  No CVA tenderness.


Musculoskeletal:  No edema.  No cyanosis.  No clubbing.  Full range of motion 

in all extremities.  No calf tenderness. Radial/pedal pulses are intact and 2+ 

bilaterally


Skin:  Skin is warm and dry.  No petechiae.  No purpura.  


Neurological:  Alert and oriented to person, place, and time.  Cranial nerves II

-XII are grossly intact. Normal speech. Strength is grossly symmetric. No 

sensory deficits.


Psychiatric:  Good eye contact.  Normal interaction, affect and behavior.








- Medical Decision Making





19 11:22








I, Dr. Khalida Jones, DO, attest that this document has been prepared under 

my direction and personally reviewed by me in its entirety.   I further attest, 

that it accurately reflects all work, treatment, procedures and medical decision

-making performed by me.  


19 13:16


a/p: 30yo female  who c/o pelvic cramping and sharp  pain


-no bleeding


-no n/v/d


-no dysuria


-per resident, no discharge


-will send labs, upreg, tvus for poss ovarian cyst


-will monitor and reassess


19 13:33


upreg +


tvus prelim shows yolk sac and iup


-pending beta hcg quant


-will monitor and reassess


19 14:13


beta [id number]


pending ultrasound read


19 14:23


IUP at 6wk 2d


ua neg


stable for dc to home


19 14:23


A- type and screen but no vaginal bleeding


stable for dc to home


follow up with dr. benitez GYN

## 2020-01-07 NOTE — PDOC
History of Present Illness





- General


Chief Complaint: Respiratory


Stated Complaint: BODY ACHE/DIZZY


Time Seen by Provider: 01/07/20 08:49


History Source: Patient


Exam Limitations: No Limitations





Past History





- Travel


Traveled outside of the country in the last 30 days: No


Close contact w/someone who was outside of country & ill: No





- Past Medical History


Allergies/Adverse Reactions: 


 Allergies











Allergy/AdvReac Type Severity Reaction Status Date / Time


 


No Known Allergies Allergy   Verified 01/07/20 08:39











Home Medications: 


Ambulatory Orders





Ibuprofen 600 mg PO Q6H #30 tablet 01/07/20 


Ondansetron [Zofran Odt -] 4 mg SL TID #10 od.tablet 01/07/20 


Oseltamivir Phosphate [Tamiflu] 75 mg PO BID #10 capsule 01/07/20 








Anemia: Yes


Asthma: No


Cancer: No


Cardiac Disorders: No


COPD: No


Diabetes: No


HTN: No


Seizures: No


Thyroid Disease: No





- Immunization History


Immunization Up to Date: Yes





- Psycho Social/Smoking Cessation Hx


Smoking History: Smoker current status UNK


Have you smoked in the past 12 months: No


'Breaking Loose' booklet given: 04/20/18


Hx Alcohol Use: No


Drug/Substance Use Hx: No


Substance Use Type: None


Hx Substance Use Treatment: No





**Review of Systems





- Review of Systems


Able to Perform ROS?: Yes


Comments:: 





01/07/20 08:51


CONSTITUTIONAL: 


Absent: fever, chills, diaphoresis, generalized weakness, malaise, loss of 

appetite


HEENT: 


Absent: rhinorrhea, nasal congestion, throat pain, throat swelling, difficulty 

swallowing, mouth swelling, ear pain, eye pain, visual Changes


CARDIOVASCULAR: 


Absent: chest pain, loss of consciousness, palpitations, irregular heart rate, 

peripheral edema


RESPIRATORY: 


Absent: cough, shortness of breath, dyspnea with exertion, orthopnea, wheezing, 

stridor, hemoptysis


GASTROINTESTINAL:


Absent: abdominal pain, abdominal distension, nausea, vomiting, diarrhea, 

constipation, melena, hematochezia


GENITOURINARY: 


Absent: dysuria, frequency, urgency, hesitancy, hematuria, flank pain, genital 

pain


MUSCULOSKELETAL: 


Absent: myalgia, arthralgia, joint swelling


SKIN: 


Absent: rash, itching, pallor


HEMATOLOGIC/IMMUNOLOGIC: 


Absent: easy bleeding, easy bruising, lymphadenopathy, frequent infections


ENDOCRINE:


Absent: unexplained weight gain, unexplained weight loss, heat intolerance, 

cold intolerance


NEUROLOGIC: 


Absent: headache, focal weakness or paresthesias, dizziness, unsteady gait, 

seizure, mental status changes, bladder or bowel incontinence


PSYCHIATRIC: 


Absent: anxiety, depression, suicidal or homicidal ideation, hallucinations.


Is the patient limited English proficient: No





*Physical Exam





- Vital Signs


 Last Vital Signs











Temp Pulse Resp BP Pulse Ox


 


 99.2 F   109 H  18   126/86   100 


 


 01/07/20 08:40  01/07/20 08:40  01/07/20 08:40  01/07/20 08:40  01/07/20 08:40














- Physical Exam





01/07/20 08:51


GENERAL: The patient is awake, alert, and fully oriented, in no acute distress.


HEAD: Normal with no signs of trauma.


EYES: Pupils equal, round and reactive to light, extraocular movements intact, 

sclera anicteric, conjunctiva clear.


HEENT: 


No nasal congestion or rhinorrhea. No sinus Tenderness. Mucous membranes are 

moist. No tonsillar erythema, exudate or edema.  Uvula is midline. No TM bulging

, dullness or erythema


Lungs:


Clear to auscultation bilaterally with no wheezes rales or rhonchi.  No 

respiratory distress.  Good aeration to the bases.


EXTREMITIES: Normal range of motion, no edema.


NEUROLOGICAL: Normal speech, normal gait.


PSYCH: Normal mood, normal affect.


SKIN: Warm, Dry, normal turgor, no rashes or lesions noted.





Medical Decision Making





- Medical Decision Making





01/07/20 10:01


Patient is a 29-year-old female with no past medical history presents with 3 

days of body aches, headache, chills and nausea.  She states she works as a 

 in the school.  She tried taking Tylenol, Mucinex with some 

relief of her symptoms.  She also admits to a productive cough.





A/P: Influenza


On exam lungs are clear to auscultation bilaterally with no wheezes rales or 

rhonchi.


Patient is neurologically intact with no focal deficits.


Patient is positive for flu B.


IV tylenol and 1 L of fluids given in the ER.


Patient is within window for Tamiflu, will treat.


Discharge home with symptomatic relief and primary care follow-up


I discussed the physical exam findings, ancillary test results and final 

diagnoses with the patient. I answered all of the patient's questions. The 

patient was satisfied with the care received and felt comfortable with the 

discharge plan and treatment plan.  The Patient agrees to follow up with the 

primary care physician/specialist within 24-72 hours. Return precautions were 

given.





Discharge





- Discharge Information


Problems reviewed: Yes


Clinical Impression/Diagnosis: 


 Influenza B





Condition: Stable


Disposition: HOME





- Admission


No





- Follow up/Referral


Referrals: 


Mark Nolasco MD [Staff Physician] - 





- Patient Discharge Instructions


Patient Printed Discharge Instructions:  DI for Influenza -- Adult


Additional Instructions: 


You have the flu. This is a virus that will get better on its own in 

approximately 7-10 days.


You will most likely have a fever for 7-10 days because of the flu. This is to 

be expected.


Drink plenty of fluids to prevent dehydration and get plenty of rest.


Warm tea and cough drops may help your symptoms as well.


Take the tamiflu twice a day for 5 days to help reduce the symptoms of the flu. 

This medication will not cure the flu.


Take Motrin as directed for pain and fever.


Take all other medications as prescribed.


Follow up with your primary care doctor this week





Return to the ED for difficulty breathing, shortness of breath, weakness, or if 

you have any other changes in your symptoms.








- Post Discharge Activity


Work/Back to School Note:  Back to Work

## 2020-01-14 NOTE — PDOC
History of Present Illness





- General


Chief Complaint: Nausea


Stated Complaint: FOLLOW UP


Time Seen by Provider: 01/14/20 04:04


History Source: Patient, Old Records


Exam Limitations: No Limitations





- History of Present Illness


Initial Comments: 





01/14/20 04:34


HISTORY OF PRESENT ILLNESS: 29-year-old woman recently diagnosed with influenza 

B with continued flulike symptoms but now having episodes of nausea with 

occasional vomiting.  Patient reports the vomitus is nonbilious nonbloody.  

Patient reports she has taken her Tamiflu as previously prescribed and while 

her symptoms have improved is now been experiencing this nausea with vomiting.  

She denies any dysuria, hematuria, vaginal bleeding, vaginal discharge, rectal 

bleeding, constipation or diarrhea.





No recent travel or sick contacts. 


PAST MEDICAL HISTORY: Denies past medical history


SURGICAL HISTORY: Denies


ALLERGIES: No known drug allergies





REVIEW OF SYSTEMS


General/Constitutional: +fever. Denies weakness, weight change.


HEENT: Denies change in vision. Denies ear pain or discharge. +sore throat.


Cardiovascular: Denies chest pain or shortness of breath.


Respiratory: Moist productive cough. Denies wheezing, or hemoptysis.


Gastrointestinal: See HPI


Genitourinary: Denies dysuria, frequency, or change in urination.


Musculoskeletal: +myalgias. Denies neck or back pain.


Skin and breasts: Denies rash or easy bruising.


Neurologic: Denies headache, vertigo, loss of consciousness, or loss of 

sensation.


Psychiatric: Denies depression or anxiety.


Endocrine: Denies increased thirst. Denies abnormal weight change.


Hematologic/Lymphatic: Denies anemia, easy bleeding, or history of blood clots.


Allergic/Immunologic: Denies hives or skin allergy. Denies latex allergy.











PHYSICAL EXAM


General Appearance: Well-appearing, appropriately dressed.  No apparent distress

, no intoxication.


HEENT: EOMI, PERRLA, normal voice, TMs retracted bilaterally.  No conjunctival 

pallor.  No photophobia, scleral icterus. Oropharynx erythematous without 

lesions or exudate. Cobblestoning noted in the posterior. No nasal discharge 

present.


Neck: Supple.  Trachea midline. No tenderness, rigidity, carotid bruit, stridor

, or thyromegaly. Nontender anterior cervical lymphadenopathy present.


Respiratory/Chest: Lungs CTAB.  No shortness of breath, chest tenderness, 

respiratory distress, accessory muscle use. No crackles, rales, rhonchi, stridor

, wheezing, dullness


Cardiovascular: RRR. S1, S2.  No JVD, murmur, bradycardia, tachycardia.


Vascular Pulses: Dorsalis-Pedis (R): 2+, Dorsalis-Pedis (L): 2+


Gastrointestinal/Abdominal: Normal bowel sounds. Abdomen soft, non-distended.  

No tenderness or rebound tenderness. No organomegaly, pulsatile mass, guarding, 

hernia, hepatomegaly, splenomegaly.


Musculoskeletal/Extremities:  Normal inspection. FROM of all extremities, 

normal capillary refill.  Pelvis Stable.  No CVA tenderness. No tenderness to 

extremities, pedal edema, swelling, erythema or deformity.


Integumentary: Appropriate color, dry, warm.  No cyanosis, erythema, jaundice 

or rash


Neurologic: CNs II-XII intact. Fully oriented, alert.  Appropriate mood/affect. 

Motor strength 5/5.  No appreciable EOM palsy, facial droop or sensory deficit.





Past History





- Past Medical History


Allergies/Adverse Reactions: 


 Allergies











Allergy/AdvReac Type Severity Reaction Status Date / Time


 


No Known Allergies Allergy   Verified 01/14/20 03:36











Home Medications: 


Ambulatory Orders





Ibuprofen 600 mg PO Q6H #30 tablet 01/07/20 


Ondansetron [Zofran Odt -] 4 mg SL TID #10 od.tablet 01/07/20 


Oseltamivir Phosphate [Tamiflu] 75 mg PO BID #10 capsule 01/07/20 


Ondansetron [Zofran -] 4 mg PO TID #21 tablet 01/14/20 








Anemia: Yes


Asthma: No


Cancer: No


Cardiac Disorders: No


COPD: No


Diabetes: No


HTN: No


Seizures: No


Thyroid Disease: No





- Immunization History


Immunization Up to Date: Yes





- Psycho Social/Smoking Cessation Hx


Smoking History: Never smoked


Have you smoked in the past 12 months: No


'Breaking Loose' booklet given: 04/20/18


Hx Alcohol Use: No


Drug/Substance Use Hx: No


Substance Use Type: None


Hx Substance Use Treatment: No





*Physical Exam





- Vital Signs


 Last Vital Signs











Temp Pulse Resp BP Pulse Ox


 


 98.8 F   77   18   124/76   100 


 


 01/14/20 02:40  01/14/20 02:40  01/14/20 02:40  01/14/20 02:40  01/14/20 02:40














Medical Decision Making





- Medical Decision Making





01/14/20 04:35


A/P:


29-year-old woman with 10 days flulike symptoms now with nausea and vomiting





As patient was diagnosed recently with influenza B this is likely continuation 

of influenza course.


Urinalysis, urine pregnancy, urine culture


Zofran 4 mg sublingual


Reassess


01/14/20 05:43


 Laboratory Tests











  01/14/20 01/14/20





  04:29 04:29


 


Urine Color   Red


 


Urine Appearance   Turbid


 


Urine pH   5.0  D


 


Ur Specific Gravity   1.028


 


Urine Protein   1+ H


 


Urine Glucose (UA)   Negative


 


Urine Ketones   Negative


 


Urine Blood   3+ H


 


Urine Nitrite   Negative


 


Urine Bilirubin   Negative


 


Urine Urobilinogen   1.0


 


Ur Leukocyte Esterase   1+ H


 


Urine WBC (Auto)   11


 


Urine RBC (Auto)   4670


 


Urine Casts (Auto)   10


 


U Epithel Cells (Auto)   4.2


 


Urine Bacteria (Auto)   8.6


 


Urine HCG, Qual  Negative 








Urinalysis consistent with woman on her.  Patient currently is.  Discharge home 

with prescription for Zofran





I discussed the physical exam findings, ancillary test results and final 

diagnoses with the patient. I answered all of the patient's questions. The 

patient was satisfied with the care received and felt comfortable with the 

discharge plan and treatment plan. The patient will call their primary care 

physician within 24 hours to arrange follow-up and will return to the Emergency 

Department with any new, persistent or worsening symptoms.





Discharge





- Discharge Information


Problems reviewed: Yes


Clinical Impression/Diagnosis: 


Nausea & vomiting


Qualifiers:


 Vomiting type: unspecified Vomiting Intractability: unspecified Qualified Code(

s): R11.2 - Nausea with vomiting, unspecified





Condition: Fair


Disposition: HOME





- Admission


No





- Additional Discharge Information


Prescriptions: 


Ondansetron [Zofran -] 4 mg PO TID #21 tablet





- Follow up/Referral





- Patient Discharge Instructions


Additional Instructions: 


Rest, drink lots of fluids: Teas, water, soups


Ginger ale, carbonated beverages for the bubbles


May try peppermint teas


Avoid heavy , spicy or fatty foods until symptoms have resolved 





Avoid contact with others until fevers and symptoms resolved


Lots of handwashing and good hygiene





Continue over-the-counter medications for symptomatic relief


Tylenol or Motrin for fever and pain


May use Zofran-one tablet dissolved on tongue as needed for nauseousness. May 

repeat times one every 8 hours





Followup with private physician in one to 2 days as needed


Return to emergency department for worsened symptoms, fevers, dehydration





- Post Discharge Activity


Work/Back to School Note:  Back to Work

## 2020-01-14 NOTE — PDOC
*Physical Exam





- Vital Signs


 Last Vital Signs











Temp Pulse Resp BP Pulse Ox


 


 98.8 F   77   18   124/76   100 


 


 01/14/20 02:40  01/14/20 02:40  01/14/20 02:40  01/14/20 02:40  01/14/20 02:40














Medical Decision Making





- Medical Decision Making





01/14/20 04:07


Patient seen by the advanced practice provider under my direct supervision. 

Ancillary testing reviewed as necessary.


I agree with plan as outlined by the advanced practice provider.





Discharge





- Discharge Information


Problems reviewed: Yes


Clinical Impression/Diagnosis: 


Nausea & vomiting


Qualifiers:


 Vomiting type: unspecified Vomiting Intractability: unspecified Qualified Code(

s): R11.2 - Nausea with vomiting, unspecified





Condition: Fair


Disposition: HOME





- Additional Discharge Information


Prescriptions: 


Ondansetron [Zofran -] 4 mg PO TID #21 tablet





- Follow up/Referral





- Patient Discharge Instructions


Additional Instructions: 


Rest, drink lots of fluids: Teas, water, soups


Ginger ale, carbonated beverages for the bubbles


May try peppermint teas


Avoid heavy , spicy or fatty foods until symptoms have resolved 





Avoid contact with others until fevers and symptoms resolved


Lots of handwashing and good hygiene





Continue over-the-counter medications for symptomatic relief


Tylenol or Motrin for fever and pain


May use Zofran-one tablet dissolved on tongue as needed for nauseousness. May 

repeat times one every 8 hours





Followup with private physician in one to 2 days as needed


Return to emergency department for worsened symptoms, fevers, dehydration





- Post Discharge Activity


Work/Back to School Note:  Back to Work
